# Patient Record
Sex: MALE | Race: WHITE | NOT HISPANIC OR LATINO | URBAN - METROPOLITAN AREA
[De-identification: names, ages, dates, MRNs, and addresses within clinical notes are randomized per-mention and may not be internally consistent; named-entity substitution may affect disease eponyms.]

---

## 2021-07-20 ENCOUNTER — OFFICE VISIT - RIVER FALLS (OUTPATIENT)
Dept: FAMILY MEDICINE | Facility: CLINIC | Age: 46
End: 2021-07-20

## 2021-07-20 ASSESSMENT — MIFFLIN-ST. JEOR: SCORE: 1971.71

## 2022-02-12 VITALS
HEIGHT: 72 IN | BODY MASS INDEX: 31.46 KG/M2 | TEMPERATURE: 97.8 F | SYSTOLIC BLOOD PRESSURE: 121 MMHG | OXYGEN SATURATION: 98 % | WEIGHT: 232.3 LBS | HEART RATE: 73 BPM | DIASTOLIC BLOOD PRESSURE: 80 MMHG

## 2022-02-15 NOTE — NURSING NOTE
Comprehensive Intake Entered On:  7/20/2021 5:18 PM CDT    Performed On:  7/20/2021 5:13 PM CDT by Renny MCCLAIN, Jacquelyn               Summary   Chief Complaint :   check mole on back/right shoulder blade   Weight Measured :   232.3 lb(Converted to: 232 lb 5 oz, 105.369 kg)    Height Measured :   72 in(Converted to: 6 ft 0 in, 182.88 cm)    Body Mass Index :   31.5 kg/m2 (HI)    Body Surface Area :   2.31 m2   Systolic Blood Pressure :   121 mmHg   Diastolic Blood Pressure :   80 mmHg   Mean Arterial Pressure :   94 mmHg   Peripheral Pulse Rate :   73 bpm   BP Site :   Right arm   Pulse Site :   Radial artery   BP Method :   Electronic   HR Method :   Electronic   Temperature Tympanic :   97.8 DegF(Converted to: 36.6 DegC)  (LOW)    Oxygen Saturation :   98 %   Jacquelyn Lawson MA - 7/20/2021 5:13 PM CDT   Health Status   Allergies Verified? :   Yes   Medication History Verified? :   Yes   Immunizations Current :   Yes   Medical History Verified? :   Yes   Pre-Visit Planning Status :   Completed   Tobacco Use? :   Current every day smoker   Jacquelyn Lawson MA - 7/20/2021 5:13 PM CDT   Consents   Consent for Immunization Exchange :   Consent Granted   Consent for Immunizations to Providers :   Consent Granted   Jacquelyn Lawson MA - 7/20/2021 5:13 PM CDT   Meds / Allergies   (As Of: 7/20/2021 5:18:18 PM CDT)   Allergies (Active)   No known allergies  Estimated Onset Date:   Unspecified ; Created By:   Bozena David; Reaction Status:   Active ; Category:   Drug ; Substance:   No known allergies ; Type:   Allergy ; Updated By:   Bozena David; Reviewed Date:   4/2/2013 6:43 PM CDT        Medication List   (As Of: 7/20/2021 5:18:18 PM CDT)   Prescription/Discharge Order    sildenafil  :   sildenafil ; Status:   Prescribed ; Ordered As Mnemonic:   Viagra 50 mg oral tablet ; Simple Display Line:   50 mg, 1 tab(s), po, daily, 6 tab(s), PRN: erectile dysfunction ; Ordering Provider:   Chika Shoemaker MD; Catalog Code:    sildenafil ; Order Dt/Tm:   4/5/2013 2:16:04 PM CDT            Social History   Social History   (As Of: 7/20/2021 5:18:18 PM CDT)   Alcohol:        Current, Socially   (Last Updated: 11/23/2010 2:43:07 PM CST by Bozena Cartagena)          Tobacco:  Current      chewing tobacco, Snuff   (Last Updated: 7/20/2021 5:16:40 PM CDT by Jacquelyn Lawson MA)          Electronic Cigarette/Vaping:        Electronic Cigarette Use: Never.   (Last Updated: 7/20/2021 5:16:27 PM CDT by Jacquelyn Lawson MA)          Employment/School:        Work/School description: .   (Last Updated: 3/25/2011 9:37:18 AM CDT by Portia Ruiz)          Other:        Marital Status,    (Last Updated: 11/23/2010 2:43:22 PM CST by Bozena Cartagena)

## 2022-02-15 NOTE — NURSING NOTE
Depression Screening Entered On:  7/28/2021 10:12 AM CDT    Performed On:  7/20/2021 10:11 AM CDT by Fabi Yao               Depression Screening   Little Interest - Pleasure in Activities :   Not at all   Feeling Down, Depressed, Hopeless :   Not at all   Initial Depression Screen Score :   0 Score   Poor Appetite or Overeating :   Not at all   Trouble Falling or Staying Asleep :   Not at all   Feeling Tired or Little Energy :   Not at all   Feeling Bad About Yourself :   Not at all   Trouble Concentrating :   Not at all   Moving or Speaking Slowly :   Not at all   Thoughts Better Off Dead or Hurting Self :   Not at all   Difficulty at Work, Home, Getting Along :   Not difficult at all   Detailed Depression Screen Score :   0    Total Depression Screen Score :   0    Fabi Yao - 7/28/2021 10:11 AM CDT

## 2022-02-15 NOTE — PROGRESS NOTES
Chief Complaint    check mole on back/right shoulder blade  History of Present Illness       Patient is here with a dark lesion on his left shoulder blade area.  He feels it may be changing.  Is been present for some time.       No immediate family history of skin cancer       No history of tanning bed use, moderate history of sun exposure  Review of Systems       See HPI.  All other review of systems negative.  Physical Exam   Vitals & Measurements    T: 97.8  F (Tympanic)  HR: 73 (Peripheral)  BP: 121/80  SpO2: 98%     HT: 72 in  WT: 232.3 lb  BMI: 31.5        Alert, oriented, no acute distress       Normal heart rate       Nonlabored breathing       Skin exam reveals sore lentigines across the shoulders, 7 mm elevated, brown to black lesion overlying the left shoulder blade  Assessment/Plan       1. SK (seborrheic keratosis) (L82.1)          Lesion has features consistent with seborrheic keratosis on dermoscopy.  This is a nonmelanotic lesion.  Reassurance offered, follow-up if this becomes irritated or changes in any way in the next few months  Patient Information     Name:NOAH REGAN      Address:      [address] [place] [phone]     Sex:Male     YOB: 1975     [phone]     Emergency Contact:RUTHIE REGAN     MRN:[id number]     FIN:[id number]     Location:[facility]     Date of Service:07/20/2021      Primary Care Physician:       Chika Shoemaker MD, [phone]      Attending Physician:       Frank Ruff MD, [phone]  Problem List/Past Medical History    Ongoing     Chronic Diarrhea     Obese     Tobacco abuse    Historical     No qualifying data  Procedure/Surgical History     Left groin lipoma excision 12 x 10 cm (04/08/2011)  Medications    Viagra 50 mg oral tablet, 50 mg= 1 tab(s), Oral, daily, PRN, 5 refills  Allergies    No known allergies  Social History    Smoking Status     Current every day smoker     Alcohol      Current, Socially      Electronic Cigarette/Vaping      Electronic Cigarette Use: Never.     Employment/School      Work/School description: .     Other      Marital Status,      Tobacco - Current      chewing tobacco, Snuff  Family History    Hypertension: Mother.    Leukemia: Grandmother (P).    MI: Grandfather (P).  Immunizations          Scheduled Immunizations          Dose Date(s)          MMR (measles/mumps/rubella)          05/31/1979          tetanus/diphth/pertuss (Tdap) adult/adol          02/21/2011